# Patient Record
Sex: FEMALE | URBAN - METROPOLITAN AREA
[De-identification: names, ages, dates, MRNs, and addresses within clinical notes are randomized per-mention and may not be internally consistent; named-entity substitution may affect disease eponyms.]

---

## 2017-02-14 ENCOUNTER — APPOINTMENT (OUTPATIENT)
Age: 27
Setting detail: DERMATOLOGY
End: 2017-02-14

## 2017-02-14 DIAGNOSIS — Z41.9 ENCOUNTER FOR PROCEDURE FOR PURPOSES OTHER THAN REMEDYING HEALTH STATE, UNSPECIFIED: ICD-10-CM

## 2017-02-14 PROCEDURE — OTHER COSMETIC CONSULTATION: LHR: OTHER

## 2017-02-14 ASSESSMENT — LOCATION DETAILED DESCRIPTION DERM: LOCATION DETAILED: RIGHT SUPRAPUBIC SKIN

## 2017-02-14 ASSESSMENT — LOCATION ZONE DERM: LOCATION ZONE: TRUNK

## 2017-02-14 ASSESSMENT — LOCATION SIMPLE DESCRIPTION DERM: LOCATION SIMPLE: GROIN

## 2017-03-09 ENCOUNTER — APPOINTMENT (OUTPATIENT)
Age: 27
Setting detail: DERMATOLOGY
End: 2017-03-09

## 2017-03-09 DIAGNOSIS — Z41.9 ENCOUNTER FOR PROCEDURE FOR PURPOSES OTHER THAN REMEDYING HEALTH STATE, UNSPECIFIED: ICD-10-CM

## 2017-03-09 PROCEDURE — OTHER ALMA LASER: OTHER

## 2017-03-09 ASSESSMENT — LOCATION SIMPLE DESCRIPTION DERM: LOCATION SIMPLE: UPPER LIP

## 2017-03-09 ASSESSMENT — LOCATION ZONE DERM: LOCATION ZONE: LIP

## 2017-03-09 ASSESSMENT — LOCATION DETAILED DESCRIPTION DERM: LOCATION DETAILED: PHILTRUM

## 2017-03-09 NOTE — PROCEDURE: ALMA LASER
Repetition Rate (Hz): 1
Repetition Rate (Hz): 0.5
Fluence (J/Cm2): 4
Matrix Dots: 7 x 7
Er:Yag Post-Care Tattoo: Post care reviewed with patient.
Spot Size In Mm: 3
Ipl Post-Care: 2nd srva92f/cm2-15mns - Calm & Correct & SPF 46 applied post-tx - no charge - employee\\n\\n
Clear Lift Post-Care: Post care reviewed with patient. Patient should avoid direct sunlight and wear broad spectrum sunscreen daily.
Treatment Fluence In Mj: 16
Fluence (Mj/Cm2): 7
External Cooling: Kaleb Cryo 5
Handpiece: Bipolar
Post-Care Instructions: I reviewed with the patient in detail post-care instructions. Patient should avoid sunlight and wear sun protection.
Pulse Mode (Optional): L
Frequency In Hz: 2
Stacks: 0
Power (Gross): Low (10w)
Post-Care To Use?: Generic
Shr Post-Care: perioral/inside of bikini (patient already had several treatments on the periphery of bikini area - now just wants inside done- Calm & Correct & SPF 46 applied post-tx\\n$100 #1 of 5 (patient has gift certificate for $500 - will have $400 left after this treatment)\\n
Energy (Mj/P): 600
Exposure Time (In Sec): 90
Pulse Type: I
Consent: Written consent obtained, risks reviewed including but not limited to crusting, scabbing, blistering, scarring, darker or lighter pigmentary change, systemic reactions, ulceration, incidental hair removal, bruising, and/or incomplete removal.
Energy In Mj: 400
 Post-Care: Bikini - 18j/cm2-30mns  #1 of \\n$120 PAN (regular price $150)
Total Energy (Kj): 0.1
External Cooling Fan Speed: 5
Nd:Yag Post-Care: Immediate endpoint whitening and pinpoint bleeding. Vaseline and ice applied. Post care reviewed with patient.
Price (Use Numbers Only, No Special Characters Or $): 100.00
Pulse Width: 30 ms
Pulse Duration In Ms: 12
Detail Level: Zone
Pulse Width: 1 sec
St Module Post-Care: Patient's skin was cleansed with mild gel wash.  Eight passes were performed entire face and neck, avoiding trachea, left side first, then right side with a thick layer of ultrasound gel.  Gel was removed, Calm & Correct serum and Lumiere eye cream applied with Journee SPF 30 post treatment.

## 2017-04-11 ENCOUNTER — APPOINTMENT (OUTPATIENT)
Age: 27
Setting detail: DERMATOLOGY
End: 2017-04-12

## 2017-04-11 DIAGNOSIS — Z41.9 ENCOUNTER FOR PROCEDURE FOR PURPOSES OTHER THAN REMEDYING HEALTH STATE, UNSPECIFIED: ICD-10-CM

## 2017-04-11 PROCEDURE — OTHER ALMA LASER: OTHER

## 2017-04-11 ASSESSMENT — LOCATION SIMPLE DESCRIPTION DERM
LOCATION SIMPLE: UPPER LIP
LOCATION SIMPLE: GROIN

## 2017-04-11 ASSESSMENT — LOCATION DETAILED DESCRIPTION DERM
LOCATION DETAILED: PHILTRUM
LOCATION DETAILED: RIGHT SUPRAPUBIC SKIN

## 2017-04-11 ASSESSMENT — LOCATION ZONE DERM
LOCATION ZONE: LIP
LOCATION ZONE: TRUNK

## 2017-04-11 NOTE — PROCEDURE: ALMA LASER
Fluence (J/Cm2): 1
Matrix Dots: 7 x 7
Fluence (J/Cm2): 5
 Post-Care: Bikini - 18j/cm2-30mns  #1 of \\n$120 PAN (regular price $150)
Power (Gross): Low (10w)
Fluence (Mj/Cm2): 7
Accent Post-Care: Post care reviewed with patient.
Spot Size In Mm: 3
Energy (Mj/P): 600
Pulse Duration In Ms: 12
Handpiece: Bipolar
Post-Care Instructions: I reviewed with the patient in detail post-care instructions. Patient should avoid sunlight and wear sun protection.
Shr Post-Care: perioral/inner bikini $100 (patient should have $300 left from $500 gift certificate after this treatment)
Treatment Fluence In Mj: 16
Post-Care To Use?: Generic
Frequency In Hz: 2
St Module Post-Care: Patient's skin was cleansed with mild gel wash.  Eight passes were performed entire face and neck, avoiding trachea, left side first, then right side with a thick layer of ultrasound gel.  Gel was removed, Calm & Correct serum and Lumiere eye cream applied with Journee SPF 30 post treatment.
Clear Lift Post-Care: Post care reviewed with patient. Patient should avoid direct sunlight and wear broad spectrum sunscreen daily.
Ipl Post-Care: Lentigos on temples/cheeks/upper lip/under chin using template.  3 small lentigos on left thigh.  Calm and Correct and Intellishade SPF 45 applied post-tx - $75 spot-tx\\nPatient purchased SkinBetter Lines/Lift Kit/AlphaRet Intensive (15% off Lines)  Pre-tx photos taken.
Price (Use Numbers Only, No Special Characters Or $): 100.00
Pulse Width: 1 sec
External Cooling: Kaleb Cryo 5
Pulse Type: I
Pulse Mode (Optional): L
Exposure Time (In Sec): 90
Energy In Mj: 400
Fluence (J/Cm2): 6
Consent: Written consent obtained, risks reviewed including but not limited to crusting, scabbing, blistering, scarring, darker or lighter pigmentary change, systemic reactions, ulceration, incidental hair removal, bruising, and/or incomplete removal.
Nd:Yag Post-Care: Immediate endpoint whitening and pinpoint bleeding. Vaseline and ice applied. Post care reviewed with patient.
Stacks: 0
Total Energy (Kj): 0.1
Detail Level: Zone
Repetition Rate (Hz): 0.5
Pulse Width: 30 ms

## 2017-05-18 ENCOUNTER — APPOINTMENT (OUTPATIENT)
Age: 27
Setting detail: DERMATOLOGY
End: 2017-05-18

## 2017-05-18 DIAGNOSIS — Z41.9 ENCOUNTER FOR PROCEDURE FOR PURPOSES OTHER THAN REMEDYING HEALTH STATE, UNSPECIFIED: ICD-10-CM

## 2017-05-18 PROCEDURE — OTHER ALMA LASER: OTHER

## 2017-05-18 ASSESSMENT — LOCATION SIMPLE DESCRIPTION DERM
LOCATION SIMPLE: UPPER LIP
LOCATION SIMPLE: GROIN

## 2017-05-18 ASSESSMENT — LOCATION DETAILED DESCRIPTION DERM
LOCATION DETAILED: PHILTRUM
LOCATION DETAILED: LEFT SUPRAPUBIC SKIN

## 2017-05-18 ASSESSMENT — LOCATION ZONE DERM
LOCATION ZONE: LIP
LOCATION ZONE: TRUNK

## 2017-05-18 NOTE — PROCEDURE: ALMA LASER
Pulse Width: 1 sec
Repetition Rate (Hz): 0.5
Passes: 1
Matrix Dots: 7 x 7
St Module Post-Care: Patient's skin was cleansed with mild gel wash.  Eight passes were performed entire face and neck, avoiding trachea, left side first, then right side with a thick layer of ultrasound gel.  Gel was removed, Calm & Correct serum and Lumiere eye cream applied with Journee SPF 30 post treatment.
Energy In Mj: 400
Price (Use Numbers Only, No Special Characters Or $): 100.00
Er:Yag Post-Care Generic: Post care reviewed with patient.
Fluence (J/Cm2): 5
Treatment Fluence In Mj: 16
Nd:Yag Post-Care: Immediate endpoint whitening and pinpoint bleeding. Vaseline and ice applied. Post care reviewed with patient.
Fluence (J/Cm2): 6
 Post-Care: Bikini - 18j/cm2-30mns  #1 of \\n$120 PAN (regular price $150)
Shr Post-Care: perioral/bikini\\n$100 - patient should have $200 credit left after this treatment on gift certificate.
External Cooling: Kaleb Cryo 5
Handpiece: Bipolar
Frequency In Hz: 2
Post-Care To Use?: Generic
Ipl Post-Care: Calm & Correct serum and Elta MD SPF 46 applied post-tx - No charge - employee
Treatment Number: 3
Energy (Mj/P): 600
Clear Lift Post-Care: Post care reviewed with patient. Patient should avoid direct sunlight and wear broad spectrum sunscreen daily.
Pulse Duration In Ms: 12
Power (Gross): Low (10w)
Pulse Mode (Optional): L
Post-Care Instructions: I reviewed with the patient in detail post-care instructions. Patient should avoid sunlight and wear sun protection.
Exposure Time (In Sec): 90
Pulse Width: 30 ms
Fluence (Mj/Cm2): 7
Detail Level: Zone
Consent: Written consent obtained, risks reviewed including but not limited to crusting, scabbing, blistering, scarring, darker or lighter pigmentary change, systemic reactions, ulceration, incidental hair removal, bruising, and/or incomplete removal.
Stacks: 0
Pulse Type: I
Total Energy (Kj): 0.1

## 2017-06-15 ENCOUNTER — APPOINTMENT (OUTPATIENT)
Age: 27
Setting detail: DERMATOLOGY
End: 2017-06-15

## 2017-06-15 DIAGNOSIS — Z41.9 ENCOUNTER FOR PROCEDURE FOR PURPOSES OTHER THAN REMEDYING HEALTH STATE, UNSPECIFIED: ICD-10-CM

## 2017-06-15 PROCEDURE — OTHER ALMA LASER: OTHER

## 2017-06-15 ASSESSMENT — LOCATION SIMPLE DESCRIPTION DERM
LOCATION SIMPLE: GROIN
LOCATION SIMPLE: UPPER LIP

## 2017-06-15 ASSESSMENT — LOCATION DETAILED DESCRIPTION DERM
LOCATION DETAILED: PHILTRUM
LOCATION DETAILED: MONS PUBIS

## 2017-06-15 ASSESSMENT — LOCATION ZONE DERM
LOCATION ZONE: LIP
LOCATION ZONE: VULVA

## 2017-06-15 NOTE — PROCEDURE: ALMA LASER
Treatment Number: 1
Clear Lift Post-Care: Post care reviewed with patient. Patient should avoid direct sunlight and wear broad spectrum sunscreen daily.
Pulse Mode (Optional): L
Pulse Width: 1 sec
Er:Yag Post-Care Generic: Post care reviewed with patient.
Consent: Written consent obtained, risks reviewed including but not limited to crusting, scabbing, blistering, scarring, darker or lighter pigmentary change, systemic reactions, ulceration, incidental hair removal, bruising, and/or incomplete removal.
Treatment Fluence In Mj: 16
Treatment Number: 4
 Post-Care: Bikini - 18j/cm2-30mns  #1 of \\n$120 PAN (regular price $150)
Matrix Dots: 7 x 7
Price (Use Numbers Only, No Special Characters Or $): 100.00
Spot Size In Mm: 3
Post-Care Instructions: I reviewed with the patient in detail post-care instructions. Patient should avoid sunlight and wear sun protection.
Pulse Width: 30 ms
Pulse Type: I
Pulse Duration In Ms: 12
Energy (Mj/P): 600
Energy In Mj: 400
Frequency In Hz: 2
Post-Care To Use?: Generic
External Cooling Fan Speed: 5
Power (Gross): Low (10w)
Shr Post-Care: Perioral/middle of bikini $100
Ipl Post-Care: neck and chest 14j/cm2-12mns -   Biafine and SPF 46 applied
Handpiece: Bipolar
Detail Level: Zone
Nd:Yag Post-Care: Immediate endpoint whitening and pinpoint bleeding. Vaseline and ice applied. Post care reviewed with patient.
Total Energy (Kj): 0.1
Stacks: 0
Fluence (Mj/Cm2): 7
Exposure Time (In Sec): 90
St Module Post-Care: Patient's skin was cleansed with mild gel wash.  Eight passes were performed entire face and neck, avoiding trachea, left side first, then right side with a thick layer of ultrasound gel.  Gel was removed, Calm & Correct serum and Lumiere eye cream applied with Journee SPF 30 post treatment.
External Cooling: Kaleb Cryo 5
Repetition Rate (Hz): 0.5